# Patient Record
Sex: MALE | Race: WHITE
[De-identification: names, ages, dates, MRNs, and addresses within clinical notes are randomized per-mention and may not be internally consistent; named-entity substitution may affect disease eponyms.]

---

## 2017-11-29 ENCOUNTER — HOSPITAL ENCOUNTER (OUTPATIENT)
Dept: HOSPITAL 47 - LABWHC1 | Age: 27
Discharge: HOME | End: 2017-11-29
Payer: COMMERCIAL

## 2017-11-29 DIAGNOSIS — J18.9: Primary | ICD-10-CM

## 2017-11-29 LAB
ANION GAP SERPL CALC-SCNC: 8 MMOL/L
BASOPHILS # BLD AUTO: 0 K/UL (ref 0–0.2)
BASOPHILS NFR BLD AUTO: 0 %
BUN SERPL-SCNC: 11 MG/DL (ref 9–20)
CALCIUM SPEC-MCNC: 9.6 MG/DL (ref 8.4–10.2)
CH: 29.1
CHCM: 33.4
CHLORIDE SERPL-SCNC: 98 MMOL/L (ref 98–107)
CO2 SERPL-SCNC: 30 MMOL/L (ref 22–30)
EOSINOPHIL # BLD AUTO: 0.2 K/UL (ref 0–0.7)
EOSINOPHIL NFR BLD AUTO: 1 %
ERYTHROCYTE [DISTWIDTH] IN BLOOD BY AUTOMATED COUNT: 5.37 M/UL (ref 4.3–5.9)
ERYTHROCYTE [DISTWIDTH] IN BLOOD: 12.5 % (ref 11.5–15.5)
GLUCOSE SERPL-MCNC: 92 MG/DL (ref 74–99)
HCT VFR BLD AUTO: 46.9 % (ref 39–53)
HDW: 2.74
HGB BLD-MCNC: 15.4 GM/DL (ref 13–17.5)
LUC NFR BLD AUTO: 2 %
LYMPHOCYTES # SPEC AUTO: 1.4 K/UL (ref 1–4.8)
LYMPHOCYTES NFR SPEC AUTO: 12 %
MCH RBC QN AUTO: 28.6 PG (ref 25–35)
MCHC RBC AUTO-ENTMCNC: 32.8 G/DL (ref 31–37)
MCV RBC AUTO: 87.3 FL (ref 80–100)
MONOCYTES # BLD AUTO: 0.7 K/UL (ref 0–1)
MONOCYTES NFR BLD AUTO: 6 %
NEUTROPHILS # BLD AUTO: 9.2 K/UL (ref 1.3–7.7)
NEUTROPHILS NFR BLD AUTO: 79 %
NON-AFRICAN AMERICAN GFR(MDRD): >60
POTASSIUM SERPL-SCNC: 4.5 MMOL/L (ref 3.5–5.1)
SODIUM SERPL-SCNC: 136 MMOL/L (ref 137–145)
WBC # BLD AUTO: 0.18 10*3/UL
WBC # BLD AUTO: 11.6 K/UL (ref 3.8–10.6)
WBC (PEROX): 11.37

## 2017-11-29 PROCEDURE — 36415 COLL VENOUS BLD VENIPUNCTURE: CPT

## 2017-11-29 PROCEDURE — 80048 BASIC METABOLIC PNL TOTAL CA: CPT

## 2017-11-29 PROCEDURE — 85025 COMPLETE CBC W/AUTO DIFF WBC: CPT

## 2020-11-11 ENCOUNTER — HOSPITAL ENCOUNTER (OUTPATIENT)
Dept: HOSPITAL 47 - LABWHC1 | Age: 30
Discharge: HOME | End: 2020-11-11
Attending: FAMILY MEDICINE
Payer: COMMERCIAL

## 2020-11-11 DIAGNOSIS — Z20.828: Primary | ICD-10-CM

## 2020-11-11 PROCEDURE — 87502 INFLUENZA DNA AMP PROBE: CPT

## 2021-06-21 ENCOUNTER — HOSPITAL ENCOUNTER (OUTPATIENT)
Dept: HOSPITAL 47 - EC | Age: 31
Setting detail: OBSERVATION
LOS: 2 days | Discharge: HOME | End: 2021-06-23
Attending: HOSPITALIST | Admitting: HOSPITALIST
Payer: COMMERCIAL

## 2021-06-21 DIAGNOSIS — Z87.891: ICD-10-CM

## 2021-06-21 DIAGNOSIS — R94.31: ICD-10-CM

## 2021-06-21 DIAGNOSIS — R47.01: Primary | ICD-10-CM

## 2021-06-21 DIAGNOSIS — Z20.822: ICD-10-CM

## 2021-06-21 DIAGNOSIS — Z79.899: ICD-10-CM

## 2021-06-21 DIAGNOSIS — F41.1: ICD-10-CM

## 2021-06-21 DIAGNOSIS — E66.9: ICD-10-CM

## 2021-06-21 DIAGNOSIS — Z82.3: ICD-10-CM

## 2021-06-21 DIAGNOSIS — R47.81: ICD-10-CM

## 2021-06-21 DIAGNOSIS — Z82.49: ICD-10-CM

## 2021-06-21 LAB
ALBUMIN SERPL-MCNC: 4.7 G/DL (ref 3.5–5)
ALP SERPL-CCNC: 53 U/L (ref 38–126)
ALT SERPL-CCNC: 30 U/L (ref 4–49)
ANION GAP SERPL CALC-SCNC: 10 MMOL/L
APTT BLD: 26.9 SEC (ref 22–30)
AST SERPL-CCNC: 32 U/L (ref 17–59)
BASOPHILS # BLD AUTO: 0 K/UL (ref 0–0.2)
BASOPHILS NFR BLD AUTO: 0 %
BUN SERPL-SCNC: 18 MG/DL (ref 9–20)
CALCIUM SPEC-MCNC: 9 MG/DL (ref 8.4–10.2)
CHLORIDE SERPL-SCNC: 102 MMOL/L (ref 98–107)
CO2 SERPL-SCNC: 27 MMOL/L (ref 22–30)
EOSINOPHIL # BLD AUTO: 0.1 K/UL (ref 0–0.7)
EOSINOPHIL NFR BLD AUTO: 1 %
ERYTHROCYTE [DISTWIDTH] IN BLOOD BY AUTOMATED COUNT: 5.26 M/UL (ref 4.3–5.9)
ERYTHROCYTE [DISTWIDTH] IN BLOOD: 12.5 % (ref 11.5–15.5)
GLUCOSE BLD-MCNC: 124 MG/DL (ref 75–99)
GLUCOSE SERPL-MCNC: 108 MG/DL (ref 74–99)
HCT VFR BLD AUTO: 44.2 % (ref 39–53)
HGB BLD-MCNC: 15.4 GM/DL (ref 13–17.5)
INR PPP: 0.9 (ref ?–1.2)
LYMPHOCYTES # SPEC AUTO: 1.8 K/UL (ref 1–4.8)
LYMPHOCYTES NFR SPEC AUTO: 23 %
MCH RBC QN AUTO: 29.3 PG (ref 25–35)
MCHC RBC AUTO-ENTMCNC: 34.9 G/DL (ref 31–37)
MCV RBC AUTO: 84 FL (ref 80–100)
MONOCYTES # BLD AUTO: 0.4 K/UL (ref 0–1)
MONOCYTES NFR BLD AUTO: 6 %
NEUTROPHILS # BLD AUTO: 5.1 K/UL (ref 1.3–7.7)
NEUTROPHILS NFR BLD AUTO: 68 %
PH UR: 5.5 [PH] (ref 5–8)
PLATELET # BLD AUTO: 204 K/UL (ref 150–450)
POTASSIUM SERPL-SCNC: 3.7 MMOL/L (ref 3.5–5.1)
PROT SERPL-MCNC: 7.3 G/DL (ref 6.3–8.2)
PT BLD: 10.2 SEC (ref 9–12)
SODIUM SERPL-SCNC: 139 MMOL/L (ref 137–145)
SP GR UR: 1.01 (ref 1–1.03)
UROBILINOGEN UR QL STRIP: <2 MG/DL (ref ?–2)
WBC # BLD AUTO: 7.6 K/UL (ref 3.8–10.6)

## 2021-06-21 PROCEDURE — 70498 CT ANGIOGRAPHY NECK: CPT

## 2021-06-21 PROCEDURE — 84443 ASSAY THYROID STIM HORMONE: CPT

## 2021-06-21 PROCEDURE — 36415 COLL VENOUS BLD VENIPUNCTURE: CPT

## 2021-06-21 PROCEDURE — 83036 HEMOGLOBIN GLYCOSYLATED A1C: CPT

## 2021-06-21 PROCEDURE — 96361 HYDRATE IV INFUSION ADD-ON: CPT

## 2021-06-21 PROCEDURE — 92523 SPEECH SOUND LANG COMPREHEN: CPT

## 2021-06-21 PROCEDURE — 93005 ELECTROCARDIOGRAM TRACING: CPT

## 2021-06-21 PROCEDURE — 81003 URINALYSIS AUTO W/O SCOPE: CPT

## 2021-06-21 PROCEDURE — 85025 COMPLETE CBC W/AUTO DIFF WBC: CPT

## 2021-06-21 PROCEDURE — 87635 SARS-COV-2 COVID-19 AMP PRB: CPT

## 2021-06-21 PROCEDURE — 80306 DRUG TEST PRSMV INSTRMNT: CPT

## 2021-06-21 PROCEDURE — 96360 HYDRATION IV INFUSION INIT: CPT

## 2021-06-21 PROCEDURE — 85610 PROTHROMBIN TIME: CPT

## 2021-06-21 PROCEDURE — 99285 EMERGENCY DEPT VISIT HI MDM: CPT

## 2021-06-21 PROCEDURE — 85730 THROMBOPLASTIN TIME PARTIAL: CPT

## 2021-06-21 PROCEDURE — 93306 TTE W/DOPPLER COMPLETE: CPT

## 2021-06-21 PROCEDURE — 70496 CT ANGIOGRAPHY HEAD: CPT

## 2021-06-21 PROCEDURE — 70450 CT HEAD/BRAIN W/O DYE: CPT

## 2021-06-21 PROCEDURE — 70553 MRI BRAIN STEM W/O & W/DYE: CPT

## 2021-06-21 PROCEDURE — 80053 COMPREHEN METABOLIC PANEL: CPT

## 2021-06-21 NOTE — CT
EXAMINATION TYPE: CT brain wo con

 

DATE OF EXAM: 6/21/2021

 

COMPARISON: None

 

HISTORY: Expressive aphasia. Pt denies any previous occurance

 

CT DLP: 1145.4 mGycm

Automated exposure control for dose reduction was used.

 

Ventricles have normal size. There is no mass effect nor midline shift. There is no sign of intracran
ial hemorrhage. Calvarium is intact. Skull base is intact. There is normal aeration of the mastoid si
nuses.

 

IMPRESSION:

Negative unenhanced head CT scan.

## 2021-06-21 NOTE — ED
Neuro HPI





- General


Chief Complaint: Neuro Symptoms/Deficit


Stated Complaint: trouble speaking


Time Seen by Provider: 06/21/21 19:32


Source: patient


Mode of arrival: ambulatory


Limitations: no limitations





- History of Present Illness


Is the patient presenting with stroke symptoms?: No


Initial Comments: 





31-year-old male with no significant past medical history presents emergency 

Department with chief complaint of difficulty speaking.  He reports symptoms 

began around 1700 today while he was at work on his desk.  He states he began 

having difficulty expressing his words and stuttering.  Does not have a history 

of anything like this before.  States now he is more anxious regarding her 

situation.  He reports about 2 weeks he was started on Lexapro 10 mg but 

otherwise no new medicines have been taken.  He denies any one-sided weakness 

paresthesias visual changes or gait instability.





- Related Data


Home Medications: 


                                Home Medications











 Medication  Instructions  Recorded  Confirmed


 


Albuterol Sulfate [Proair 2 puff INHALATION RT-Q4H PRN 11/20/17 12/07/17





Respiclick]   


 


Beclomethasone Dipropionate [Qvar 1 puff INHALATION DAILY 12/05/17 12/07/17





80 mcg]   


 


Levofloxacin [Levaquin] 500 mg PO DAILY 12/05/17 12/07/17


 


Loratadine-Pseudoeph 5-120 mg 1 each PO Q12HR PRN 12/05/17 12/07/17





[Claritin-D 12 Hour]   


 


Promethaz-Cod 6.25-10 mg/5 ml 5 ml PO Q6HR PRN 12/05/17 12/07/17





[Phenergan with Codeine]   











Allergies/Adverse Reactions: 


                                    Allergies











Allergy/AdvReac Type Severity Reaction Status Date / Time


 


No Known Allergies Allergy   Verified 06/21/21 19:07














Review of Systems


ROS Statement: 


Those systems with pertinent positive or pertinent negative responses have been 

documented in the HPI.





ROS Other: All systems not noted in ROS Statement are negative.





General Exam


Limitations: no limitations


General appearance: alert, in no apparent distress


Head exam: Present: atraumatic, normocephalic, normal inspection


Eye exam: Present: normal appearance, PERRL, EOMI


Pupils: Present: normal accommodation


ENT exam: Present: normal exam, normal oropharynx, mucous membranes moist


Neck exam: Present: normal inspection, full ROM.  Absent: tenderness


Respiratory exam: Present: normal lung sounds bilaterally.  Absent: respiratory 

distress


Cardiovascular Exam: Present: regular rate, normal rhythm, normal heart sounds. 

Absent: systolic murmur


Extremities exam: Present: normal inspection, full ROM, normal capillary refill.

 Absent: tenderness, pedal edema, joint swelling


Back exam: Present: normal inspection, full ROM.  Absent: tenderness, CVA 

tenderness (R), CVA tenderness (L)


Neurological exam: Present: alert, oriented X3, CN II-XII intact, normal gait


  ** Expanded


Neurological exam: Present: expressive aphasia


Patient oriented to: Present: person, place, time


Speech: Present: expressive aphasia


Cranial nerves: EOM's Intact: Normal, Gag Reflex: Normal, Tongue Deviation: 

Normal, Nystagmus: Normal, Facial Sensation: Normal


Cerebellar function: Finger to Nose: Normal


Upper motor neuron: Pronator Drift: Normal


Sensory exam: Upper Extremity Light Touch: Normal, Upper Extremity Pin Prick: 

Normal, Lower Extremity Light Touch: Normal, Lower Extremity Pin Prick: Normal


Motor strength exam: RUE: 5, LUE: 5, RLE: 5, LLE: 5


Psychiatric exam: Present: normal affect, normal mood


Skin exam: Present: warm, dry, intact, normal color





Stroke MDM





- Lab Data


Result diagrams: 


                                 06/21/21 19:51





                                 06/21/21 19:51


                                   Lab Results











  06/21/21 06/21/21 06/21/21 Range/Units





  19:17 19:51 19:51 


 


WBC   7.6   (3.8-10.6)  k/uL


 


RBC   5.26   (4.30-5.90)  m/uL


 


Hgb   15.4   (13.0-17.5)  gm/dL


 


Hct   44.2   (39.0-53.0)  %


 


MCV   84.0   (80.0-100.0)  fL


 


MCH   29.3   (25.0-35.0)  pg


 


MCHC   34.9   (31.0-37.0)  g/dL


 


RDW   12.5   (11.5-15.5)  %


 


Plt Count   204   (150-450)  k/uL


 


MPV   7.8   


 


Neutrophils %   68   %


 


Lymphocytes %   23   %


 


Monocytes %   6   %


 


Eosinophils %   1   %


 


Basophils %   0   %


 


Neutrophils #   5.1   (1.3-7.7)  k/uL


 


Lymphocytes #   1.8   (1.0-4.8)  k/uL


 


Monocytes #   0.4   (0-1.0)  k/uL


 


Eosinophils #   0.1   (0-0.7)  k/uL


 


Basophils #   0.0   (0-0.2)  k/uL


 


PT     (9.0-12.0)  sec


 


INR     (<1.2)  


 


APTT     (22.0-30.0)  sec


 


Sodium    139  (137-145)  mmol/L


 


Potassium    3.7  (3.5-5.1)  mmol/L


 


Chloride    102  ()  mmol/L


 


Carbon Dioxide    27  (22-30)  mmol/L


 


Anion Gap    10  mmol/L


 


BUN    18  (9-20)  mg/dL


 


Creatinine    1.07  (0.66-1.25)  mg/dL


 


Est GFR (CKD-EPI)AfAm    >90  (>60 ml/min/1.73 sqM)  


 


Est GFR (CKD-EPI)NonAf    >90  (>60 ml/min/1.73 sqM)  


 


Glucose    108 H  (74-99)  mg/dL


 


POC Glucose (mg/dL)  124 H    (75-99)  mg/dL


 


POC Glu Operater [facility]    


 


Calcium    9.0  (8.4-10.2)  mg/dL


 


Total Bilirubin    0.3  (0.2-1.3)  mg/dL


 


AST    32  (17-59)  U/L


 


ALT    30  (4-49)  U/L


 


Alkaline Phosphatase    53  ()  U/L


 


Total Protein    7.3  (6.3-8.2)  g/dL


 


Albumin    4.7  (3.5-5.0)  g/dL


 


Urine Color     


 


Urine Appearance     (Clear)  


 


Urine pH     (5.0-8.0)  


 


Ur Specific Gravity     (1.001-1.035)  


 


Urine Protein     (Negative)  


 


Urine Glucose (UA)     (Negative)  


 


Urine Ketones     (Negative)  


 


Urine Blood     (Negative)  


 


Urine Nitrite     (Negative)  


 


Urine Bilirubin     (Negative)  


 


Urine Urobilinogen     (<2.0)  mg/dL


 


Ur Leukocyte Esterase     (Negative)  














  06/21/21 06/21/21 Range/Units





  19:51 19:51 


 


WBC    (3.8-10.6)  k/uL


 


RBC    (4.30-5.90)  m/uL


 


Hgb    (13.0-17.5)  gm/dL


 


Hct    (39.0-53.0)  %


 


MCV    (80.0-100.0)  fL


 


MCH    (25.0-35.0)  pg


 


MCHC    (31.0-37.0)  g/dL


 


RDW    (11.5-15.5)  %


 


Plt Count    (150-450)  k/uL


 


MPV    


 


Neutrophils %    %


 


Lymphocytes %    %


 


Monocytes %    %


 


Eosinophils %    %


 


Basophils %    %


 


Neutrophils #    (1.3-7.7)  k/uL


 


Lymphocytes #    (1.0-4.8)  k/uL


 


Monocytes #    (0-1.0)  k/uL


 


Eosinophils #    (0-0.7)  k/uL


 


Basophils #    (0-0.2)  k/uL


 


PT  10.2   (9.0-12.0)  sec


 


INR  0.9   (<1.2)  


 


APTT  26.9   (22.0-30.0)  sec


 


Sodium    (137-145)  mmol/L


 


Potassium    (3.5-5.1)  mmol/L


 


Chloride    ()  mmol/L


 


Carbon Dioxide    (22-30)  mmol/L


 


Anion Gap    mmol/L


 


BUN    (9-20)  mg/dL


 


Creatinine    (0.66-1.25)  mg/dL


 


Est GFR (CKD-EPI)AfAm    (>60 ml/min/1.73 sqM)  


 


Est GFR (CKD-EPI)NonAf    (>60 ml/min/1.73 sqM)  


 


Glucose    (74-99)  mg/dL


 


POC Glucose (mg/dL)    (75-99)  mg/dL


 


POC Glu Operater ID    


 


Calcium    (8.4-10.2)  mg/dL


 


Total Bilirubin    (0.2-1.3)  mg/dL


 


AST    (17-59)  U/L


 


ALT    (4-49)  U/L


 


Alkaline Phosphatase    ()  U/L


 


Total Protein    (6.3-8.2)  g/dL


 


Albumin    (3.5-5.0)  g/dL


 


Urine Color   Light Yellow  


 


Urine Appearance   Clear  (Clear)  


 


Urine pH   5.5  (5.0-8.0)  


 


Ur Specific Gravity   1.007  (1.001-1.035)  


 


Urine Protein   Negative  (Negative)  


 


Urine Glucose (UA)   Negative  (Negative)  


 


Urine Ketones   Negative  (Negative)  


 


Urine Blood   Negative  (Negative)  


 


Urine Nitrite   Negative  (Negative)  


 


Urine Bilirubin   Negative  (Negative)  


 


Urine Urobilinogen   <2.0  (<2.0)  mg/dL


 


Ur Leukocyte Esterase   Negative  (Negative)  














- Medical Decision Making


31-year-old male with no significant past medical history presents emergency 

Department with chief complaint of difficulty speaking.  On physical 

examination, patient is experiencing difficulties with speech.  He states it 

feels more of difficulty verbalizing towards even though he can think of them.  

States it does not feel like stuttering.  I suspect expressive aphasia.  No 

other focal deficits on exam.  CT of the brain is unremarkable.  CT angiogram 

head and neck is also unremarkable.  Laboratory work shows no acute findings.  I

spoke with Dr. Munson who recommended he is on consult .  He also recommended 

psychiatry consult due to patient being started on Lexapro for generalized 

anxiety.  He also recommended aspirin and Lipitor.  Patient will be admitted for

further medical management.  Case discussed with Dr. Marte.


Admitting physician is 


Neurology consult





- EKG Data


-: EKG Interpreted by Me


EKG shows normal: sinus rhythm


Rate: normal





Past Medical History


Past Medical History: No Reported History


History of Any Multi-Drug Resistant Organisms: None Reported


Past Surgical History: Ear Surgery


Additional Past Surgical History / Comment(s): ear tubes, plastic sx on nose as 

a child


Past Anesthesia/Blood Transfusion Reactions: No Reported Reaction


Past Psychological History: No Psychological Hx Reported


Smoking Status: Former smoker


Past Alcohol Use History: Occasional


Past Drug Use History: None Reported





- Past Family History


  ** Mother


Family Medical History: No Reported History





  ** Father


Family Medical History: Hyperlipidemia, Hypertension


Additional Family Medical History / Comment(s): back problems





Course


                                   Vital Signs











  06/21/21 06/21/21 06/21/21





  19:03 21:37 23:00


 


Temperature 98.9 F  


 


Pulse Rate 85 63 59 L


 


Respiratory 18 16 16





Rate   


 


Blood Pressure 149/96 138/85 146/96


 


O2 Sat by Pulse 99 98 97





Oximetry   














Disposition


Clinical Impression: 


 Difficulty with speech





Disposition: ADMITTED AS IP TO THIS [facility]


Condition: Stable


Is patient prescribed a controlled substance at d/c from ED?: No


Referrals: 


ZONIA Benites III, MD [Primary Care Provider] - 1-2 days


Time of Disposition: 23:07

## 2021-06-21 NOTE — CT
EXAMINATION TYPE: CT angio head neck

 

DATE OF EXAM: 6/21/2021

 

COMPARISON: None

 

HISTORY: aphaisa, nuero deficit

 

CT DLP: 980 mGycm

Automated exposure control for dose reduction was used.

 

CONTRAST: 

Performed with IV Contrast, patient injected with 100 mL of Isovue 370.

 

 

Images obtained from the aortic arch to the vertex of the brain with IV contrast. There are 3-D post 
processed images.

 

There is normal branching pattern of the great vessels on the aortic arch. There is bilateral arteria
l flow in the subclavian arteries. There is arterial flow in the common internal and external carotid
 arteries. There is wide patency of the carotid artery bifurcations. There is arterial flow in both v
ertebral arteries.

 

There is arterial flow in the vertebrobasilar artery system.

 

There is no evidence of carotid or vertebral artery aneurysm or dissection.

 

There is arterial flow in the anterior middle and posterior cerebral arteries. I see no evidence of i
ntracranial aneurysm or neovascularity. There is normal enhancement of the venous sinuses. I see no e
vidence of intracranial arterial stenosis. There is no mass effect.

 

IMPRESSION:

Negative CT angiogram of the neck. New graft negative CT angiogram of the brain.

## 2021-06-22 LAB — HBA1C MFR BLD: 4.9 % (ref 4–6)

## 2021-06-22 RX ADMIN — CLOPIDOGREL BISULFATE SCH MG: 75 TABLET ORAL at 20:16

## 2021-06-22 RX ADMIN — ASPIRIN 81 MG CHEWABLE TABLET SCH MG: 81 TABLET CHEWABLE at 09:47

## 2021-06-22 RX ADMIN — CEFAZOLIN SCH MLS/HR: 330 INJECTION, POWDER, FOR SOLUTION INTRAMUSCULAR; INTRAVENOUS at 00:04

## 2021-06-22 RX ADMIN — ESCITALOPRAM OXALATE SCH MG: 10 TABLET, FILM COATED ORAL at 14:12

## 2021-06-22 RX ADMIN — CEFAZOLIN SCH MLS/HR: 330 INJECTION, POWDER, FOR SOLUTION INTRAMUSCULAR; INTRAVENOUS at 13:50

## 2021-06-22 NOTE — MR
EXAMINATION TYPE: MR brain wo/w con

 

DATE OF EXAM: 6/22/2021

 

COMPARISON: CT brain 6/21/2021

 

HISTORY: Slurred speech, stroke.  Transient difficulty getting words out

 

 

TECHNIQUE: 

Multiplanar, multisequence images of the brain and brainstem is performed without and with IV contras
t, utilizing 12 mL intravenous Gadavist .

 

FINDINGS: Diffusion weighted images demonstrate no evidence of a recent infarct or other diffusion ab
normality.  There is no extra-axial fluid collection or significant white matter signal abnormality. 
 The ventricular system and cisternal spaces are normal in size and appearance.  The brain volume is 
age appropriate.

 

Midline structures demonstrate normal morphology, there is a partially empty sella.  The craniocervic
al junction appears within normal limits.  Post contrast images demonstrate no abnormal enhancement. 
The dural venous sinuses appear patent. The visualized sinuses are remarkable for mild inflammatory c
hange in ethmoid air cells and the globes are intact.

 

IMPRESSION: Normal brain MRI with and without contrast

## 2021-06-22 NOTE — P.CN
Psychiatric Consult





- .


Consult date: 06/22/21


Consult:: 





06/22/21 12:48


IDENTIFYING DATA: This patient is a 31-year-old  male who currently 

lives with his fiance in a house has no kids and currently works as a sales 

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





REASON FOR REFERRAL: Psychiatry was consulted for "generalized anxiety, placed 

on new medication"





HISTORY OF PRESENT ILLNESS: The patient presented to the hospital yesterday and 

was complaining of speech difficulties which had a sudden onset.  Apparently 

patient had these symptoms, approximately 5 PM yesterday while he was at work 

and was reported to be stuttering and "couldn't express his words" according to 

ER report.  Patient was also endorsing anxiety and having been started on 

Lexapro recently.  Patient's UA was negative computed tomography scan of his 

brain was negative.  Neurology was consulted and following patient for possible 

stroke.  Patient was seen today at the bedside and agreeable to speak to writer.

 He claims that he was in his office and an employee asked him a question 

however he states that "I was unable to respond" and claims that she has 

difficulty initiating a response.  He states that the symptoms lasted 

approximately 5-6 hours and subsided afterwards and states that he is not having

any symptoms at this time.  He claims that he has never had an incident like 

this in the past and did not mention any other lateralizing signs or denies any 

visual changes it are paresthesias or weakness.  He claims that his mood is 

"fine" and was fairly future oriented.  He states that he has had anxiety mainly

related to his work and the stressors involved with it.  He claims that it is 

present mainly at night time where he has racing thoughts and difficulty 

initiating sleep.  He claims that on a good night sleep approximately 6 hours a 

night.  He states that he has been having extra stress at work lately.  He 

claims that his appetite is fair. At this time patient denies any suicidal or 

homical ideations, intent or plan. Patient denies any auditory, visual 

hallucinations and denies any paranoia or delusions. Patients admits to using 

cigarettes in the past however has not quit.  He denies any other recreational 

drug use.





PAST PSYCHIATRIC HISTORY: Patient has a a history of anxiety.  Patient is curr

ently on Lexapro 10 mg daily prescribed by his PCP.  Patient denies any previous

psychiatric hospitalizations. Patient denies any psychiatric outpatient follow-

up. Patient denies any history of suicide attempts in the past.





PAST MEDICAL HISTORY: denies. 





ALLERGIES: as per EMR. 





CHEMICAL DEPENDENCY HISTORY: as per HPI. 





FAMILY PSYCHIATRIC/SUBSTANCE USE HISTORY: denies





SOCIAL HISTORY: Patient was born and raised in [facility].  He states 

that he has some college.  He claims that he works as a  at a car 

car dealership.  He claims that he currently lives with his fiance in a house. 

They have no kids together.





MENTAL STATUS EXAM: 


General Appearance: Patient appears to be well built, stated age is alert, 

pleasant, and cooperative. Patient appears to have fair hygiene and grooming 

wearing hospital gown with fair eye contact.


Behavior: Patient is calmly lying in bed without any agitated behavior.


Speech: Patient's speech is fluent and nonpressured. 


Mood/Affect: Patient reports their mood is "anxious at times", affect is 

congruent


Suicidality/Homicidality:  Patient denies having any suicidal or homicidal 

ideation intent or plan.  


Perceptions: Patient denies any visual hallucinations and denies any auditory 

hallucinations  


Though content/process: There is no evidence of any delusional thought content 

and thought process is linear and goal-directed.  Concerned of his symptoms.


Memory and concentration: AOX3, grossly intact for the purposes of this session.

Can spell "WORLD" backwards


Judgment and insight: fair





IMPRESSIONS: 


Anxiety disorder unspecified r/o generalized anxiety disorder vs adjustment 

disorder with anxiety





PLAN: 


-At this time patient DOES NOT meet criteria for inpatient psychiatric 

admission.


-Would recommend the following medication changes/additions: Continue with 

Lexapro 10 mg daily for mood/anxiety.  Lexapro is most likely not the cause of 

patient's acute neurological symptoms as it is not a common adverse reaction.  

Patient is agreeable to start trazodone 25 mg daily at bedtime for 

insomnia/mood.  Spoke with patient about the various side effects of the 

medications including trazodone and to risk of priapism as an adverse reaction 

and the need to seek urgent medical attention if this does occur.


- to provide patient with outpatient mental health/psychiatry 

resources for appropriate follow up upon discharge


-discussed stress reducing techniques


-Communicated plan to patient's nurse


-Psychiatry will sign off at this time


-Please contact with any questions.

## 2021-06-22 NOTE — ECHOF
Referral Reason:stroke. Bubble study



MEASUREMENTS

--------

HEIGHT: 175.3 cm

WEIGHT: 115.7 kg

BP: 145/88

RVIDd:   2.8 cm     (< 3.3)

IVSd:   1.4 cm     (0.6 - 1.1)

LVIDd:   4.5 cm     (3.9 - 5.3)

LVPWd:   1.2 cm     (0.6 - 1.1)

IVSs:   1.7 cm

LVIDs:   3.4 cm

LVPWs:   1.9 cm

LA Diam:   3.5 cm     (2.7 - 3.8)

LAESV Index (A-L):   23.68 ml/m

Ao Diam:   3.3 cm     (2.0 - 3.7)

AV Cusp:   2.3 cm     (1.5 - 2.6)

MV EXCURSION:   18.547 mm     (> 18.000)

MV EF SLOPE:   125 mm/s     (70 - 150)

EPSS:   1.1 cm

MV E Jimmie:   0.87 m/s

MV DecT:   220 ms

MV A Jimmie:   0.61 m/s

MV E/A Ratio:   1.43 

RAP:   5.00 mmHg

RVSP:   19.65 mmHg







FINDINGS

--------

Resting bradycardia (HR<60bpm).

This was a technically adequate study.

The left ventricular size is normal.   There is moderate concentric left ventricular hypertrophy.   O
verall left ventricular systolic function is normal with, an EF between 60 - 65 %.

The right ventricle is normal in size.

Normal LA  size by volume 22+/-6 ml/m2.

The right atrium is normal in size.

Contrast study was performed with 2 iv injections of 8 ccs of agitated normal saline, at rest, and wi
th cough.

Negative saline bubble study. No PFO noted

Interatrial and interventricular septum intact.

The aortic valve is trileaflet, and appears structurally normal. No aortic stenosis or regurgitation.


The mitral valve is normal.

Mild tricuspid regurgitation present.   Right ventricular systolic pressure is normal at < 35 mmHg.

Trace/mild (physiologic)  pulmonic regurgitation.

The aortic root size is normal.

Normal inferior vena cava with normal inspiratory collapse consistent with estimated right atrial pre
ssure of  5 mmHg.

There is no pericardial effusion.



CONCLUSIONS

--------

1. The left ventricular size is normal.

2. There is moderate concentric left ventricular hypertrophy.

3. Overall left ventricular systolic function is normal with, an EF between 60 - 65 %.

4. Contrast study was performed with 2 iv injections of 8 ccs of agitated normal saline, at rest, and
 with cough.

5. Negative saline bubble study. No PFO noted

6. Mild tricuspid regurgitation present.

7. Trace/mild (physiologic)  pulmonic regurgitation.

8. There is no pericardial effusion.





SONOGRAPHER: Sunitha Hernandez RDCS

## 2021-06-22 NOTE — P.CNNES
History of Present Illness


Consult date: 06/22/21


Requesting physician: Terence Huerta


Reason for Consult: speech difficulties


History of Present Illness: 


This is a 31-year-old gentleman with history of anxiety who presented emergency 

department on 06/21/2021 with a complaint of difficulty speaking.  He stated 

that yesterday while he was at work around 5pm he noticed that he knew what he 

wanted to say but he was having difficulty getting his words out and felt he has

some mild slurring of the speech.  He also noticed that he was having 

stuttering.  He denies any other neurological complaints associate with this.  

Denies of any focal weakness, numbness, difficulty swallowing, any visual 

disturbance.  He denies any headache prior to the episode.  He felt like he had 

the mild headache when he was in the ED.  He felt his his speech difficulty 

lasted a few hours and resolved by nighttime.  He denies any history of stroke 

or TIA in the past.   He stated that in the last 2 to 3 weeks started on a new 

medication Lexapro 10 mg otherwise he has not been on any other medication.  He 

denies feeling more anxious than normal yesterday.  There is a family history of

stroke but he cannot tell me at what age and who had the stroke.  He is to have 

a history of smoking and has stopped about 2 years ago (smoked for 10-12 years 

about 1PPD).  She socially drinks alcohol.  He denies any illicit drug use.


The patient home medications includes of Lexapro 10 mg daily, loratadine, 

vitamin D3.





Some of the workup in the hospital consisted of:


Initial vital signs: Blood pressure of 149/96, heart rate of 85, respiratory of 

18, temperature of 98.9 Fahrenheit oral and pulse ox of 99% room air.


CT of the head is reported as negative unenhanced head CT scan.


CT angiography of the head and neck is reported as negative.


EKG is reported as poor data quality, interpretation may be adversely affected. 

Normal sinus rhythm.  Possible lateral infarct, age undetermined.  Abnormal EKG.


CBC with differential is within normal limits.


The chemistry panel seems unremarkable.  Initial POC glucose is 124.








Review of Systems


Review of system:  The 12 point system was reviewed and apparent positive and 

negative per HPI.








Past Medical History


Past Medical History: No Reported History


History of Any Multi-Drug Resistant Organisms: None Reported


Past Surgical History: Ear Surgery


Additional Past Surgical History / Comment(s): ear tubes, plastic sx on nose as 

a child


Past Anesthesia/Blood Transfusion Reactions: No Reported Reaction


Past Psychological History: No Psychological Hx Reported


Additional Psychological History / Comment(s): pt is independant. lives alone in

a loft. no pets. no  service, no medical euqipment., works in sales


Smoking Status: Former smoker


Past Alcohol Use History: Occasional


Additional Past Alcohol Use History / Comment(s): started smoking at age 17 at 

age 25. smoked 1 ppd


Past Drug Use History: None Reported


Additional Drug Use History / Comment(s): quit smoking marijuana in college





- Past Family History


  ** Mother


Family Medical History: No Reported History





  ** Father


Family Medical History: Hyperlipidemia, Hypertension


Additional Family Medical History / Comment(s): back problems





Medications and Allergies


                                Home Medications











 Medication  Instructions  Recorded  Confirmed  Type


 


Cholecalciferol (Vitamin D3) 125 mcg PO DAILY 06/21/21 06/21/21 History





[Vitamin D3 (5000 Iu)]    


 


Escitalopram [Lexapro] 10 mg PO DAILY 06/21/21 06/21/21 History


 


Fluticasone Nasal Spray [Flonase 2 spr EA NOSTRIL DAILY PRN 06/21/21 06/21/21 

History





Nasal Spray]    


 


Loratadine [Claritin] 10 mg PO DAILY 06/21/21 06/21/21 History








                                    Allergies











Allergy/AdvReac Type Severity Reaction Status Date / Time


 


No Known Allergies Allergy   Verified 06/21/21 23:25














Physical Examination





- Vital Signs


Vital Signs: 


                                   Vital Signs











  Temp Pulse Pulse Resp BP BP Pulse Ox


 


 06/22/21 07:00  97.8 F   65  17   145/88  98


 


 06/22/21 00:55  97.6 F   58 L  16   151/95  98


 


 06/21/21 23:00   59 L   16  146/96   97


 


 06/21/21 21:37   63   16  138/85   98


 


 06/21/21 19:03  98.9 F  85   18  149/96   99








                                Intake and Output











 06/21/21 06/22/21 06/22/21





 22:59 06:59 14:59


 


Intake Total   240


 


Balance   240


 


Intake:   


 


  Oral   240


 


Other:   


 


  Voiding Method  Toilet Toilet


 


  # Voids  1 


 


  Weight 115.666 kg 115.666 kg 











GENERAL: The patient is lying in bed and is not in acute distress.


CHEST: The heart rate is regular rate rhythm.   No murmurs to auscultation.  No 

carotid bruit bilaterally.


LUNG: Clear to auscultation bilaterally no wheezing noted throughout.  Not 

labored breathing.


ABDOMEN/GI: Bowel sounds present in all 4 quadrants. No tenderness to palpation 

throughout.





NEUROLOGICAL:


Higher mental function: The patient is awake, alert, oriented to self, place and

 time.  Patient is following commands.  No aphasia and no neglect.


Cranial nerves: The pupils are round, equal and reactive to light and 

accommodation.  Visual fields are full to confrontation throughout.  Extraocular

 movement is intact no nystagmus is noted.  Facial sensation is normal to touch 

throughout.  The facial strength is normal throughout.  Hearing is normal 

bilaterally to hand rub.  Tongue is midline and moved side-to-side without any 

difficulty.  No dysarthria is noted.  Shoulder shrug is normal bilaterally.


Motor: Gait is normal with normal arm swings.  The strength is 5 over 5 

throughout.  Normal tone and bulk.  


Cerebellum: Normal finger to nose heel to shin bilaterally.


Sensation: Sensation is normal to touch throughout.


Reflexes (right/left): 3+ over bilateral patellar.  Otherwise 2+ throughout.


Plantars are downgoing bilaterally. 








Results


Urinalysis is negative for urinary tract infection


Corona virus patient was not detected.








- Laboratory Findings


CBC and BMP: 


                                 06/21/21 19:51





                                 06/21/21 19:51


Abnormal Lab Findings: 


                                  Abnormal Labs











  06/21/21 06/21/21





  19:17 19:51


 


Glucose   108 H


 


POC Glucose (mg/dL)  124 H 














Assessment and Plan


Assessment: 





Transient episode of expressive aphasia with stuttering.  Likely Transient Ische

juan alberto attack.  I hightly doubt medication effect (Lexapro especially since low 

dose and unlikely adverse effect).  


Anxiety disorder





Plan: 





* CT of the head is reported as negative unenhanced head CT scan.


* CT angiography of the head and neck is reported as negative.





* He was given aspirin 325 mg once in the ED.  I started the patient on ASA 81mg

   daily and Plavix 75mg daily.  The patient is to be on dual antiplatele for 21

   days then discontinue Plavix and permanetly continue ASA 81mg dialy.  

  Continue Lipitor 40mg qhs for secondary stroke prophylaxis.


* I ordered MRI of the brain w/ and w/o, 2D echo with bubble, TSH, lipid panel, 

  urine drug screen.


* Place patient on Q4 hours neuro checks and contious cardiac monitoring.


* Consulted speech therapy.  Physical therapy and occupational therapy are not 

  consulted since patient does not have any focal weakness.


* Psychiatry team is consulted.


* Will defer the rest of medical management to the primary team.


* The patient was notified that he needs to follow-up with a neurologist as 

  outpatient within 1-2 weeks as outpatient.





UPDATE:


MR the brain with and without is reported as normal.


2-D echo was reported as moderate concentric left ventricular hypertrophy.  

Ejection fraction of 60-65%.  Negative saline bubble study.  No PFO noted.


Urine toxicology screen is negative





We'll observe the patient overnight and and by tomorrow if he does not have any 

fruther symptoms then he is clear for discharge from a neurological standpoint.


The patient was notified that he needs to follow-up with a neurologist within 1-

2 weeks as an outpatient.





The plan is discussed with the patient and his parents who are at bedside.


Thank you for the consultation.





Higinio Munson MD


Neuro-Hospitalist





Time with Patient: Greater than 30

## 2021-06-22 NOTE — HP
HISTORY AND PHYSICAL



DATE OF SERVICE:

06/22/2021



CHIEF COMPLAINT:

Difficulty in speaking.



HISTORY OF PRESENT ILLNESS:

This 31-year-old gentleman with past medical history of no significant medical issues

was at work yesterday and the patient had some difficulty in speaking. The patient's

symptoms began about 5:00 PM. The patient is able to formulate what is supposed to be

spoken, but otherwise not coming out of his mouth. After several significant

difficulties, some words would come out according to him. Similar symptoms recurred

about 1:30 and the patient was taken to [facility] for further evaluation and

treatment.  There is no history of fever, rigors, or chills at this time.  The initial

evaluation showed glucose 108, otherwise Covid-19 was also negative. MRI has been

ordered at this time.  A CT scan of the brain and CT angio were done, showed no

evidence of any acute abnormality.  Psychiatric consultation was also sought as well as

Neurology consultation.  There is no history of fever, rigors, chills at this time.

The possibility of stroke versus seizures is being considered.



PAST MEDICAL HISTORY:

No significant cardiorespiratory illness.



MEDICATIONS:

Home medications are Claritin, vitamin D3, Flonase, Lexapro.



ALLERGIES:

None.



FAMILY HISTORY:

History of back problems in the family.  No history of strokes or heart attack.



SOCIAL HISTORY:

Previous history of smoking. No history of current smoking or alcohol intake.



REVIEW OF SYSTEMS:

ENT: No diminished vision.

CARDIOVASCULAR: No angina.

RESPIRATORY: No cough.

GI: No nausea or vomiting.

: No dysuria or hematuria.

NERVOUS SYSTEM: As mentioned earlier.

MUSCULOSKELETAL: As mentioned earlier.

HEMATOLOGY: No history of anemia.

ENDOCRINE: No history of diabetes or hypothyroidism.

CONSTITUTIONAL: As mentioned earlier.

DERMATOLOGY: Negative.

PSYCHIATRY8:  As mentioned.



PHYSICAL EXAMINATION:

Alert, oriented x3. Pulse 69, blood pressure 131/72, respirations 17, temperature 98.2,

pulse ox 97% on room air.

HEENT: Conjunctivae normal.

NECK: Supple.

CARDIOVASCULAR: S1 and S2 muffled.

LUNGS: Breath sounds heart at the bases. No rhonchi no crackles.

ABDOMEN:  Soft, nontender.

NERVOUS SYSTEM: Higher functions as mentioned earlier. Moves all 4 limbs.  No focal

motor deficit.

LYMPHATICS: No lymph nodes palpable.

SKIN: No rashes or ulcers.

JOINTS: No active deforming arthropathy.



LAB STUDIES:

WBC 7.2, hemoglobin 15.4, glucose 108.



ASSESSMENT:

1. Difficulty in speaking, possible acute transient ischemic attack.

2. Rule out seizure disorder.

3. History of ear tubes.

4. Remote history of nicotine dependence.

5. Obesity with body mass of 37.7.

6. FULL CODE.



RECOMMENDATIONS:

In this 31-year-old gentleman who presented with multiple medical issues, at this time

I recommend to continue current management and symptomatic treatment.  Neurology

evaluation.  Two-D echo was read by Cardiology shows ejection fraction 60 to 65%. CT

angio was negative.  MRI is pending at this time.  Follow closely with Neurology.

Remote telemetry. Guarded prognosis because of multiple complex problems. Further

recommendations to follow.





MMODL / IJN: [id number] / Job#: [id number]

## 2021-06-23 VITALS
TEMPERATURE: 97.6 F | RESPIRATION RATE: 17 BRPM | SYSTOLIC BLOOD PRESSURE: 114 MMHG | HEART RATE: 59 BPM | DIASTOLIC BLOOD PRESSURE: 61 MMHG

## 2021-06-23 RX ADMIN — CEFAZOLIN SCH: 330 INJECTION, POWDER, FOR SOLUTION INTRAMUSCULAR; INTRAVENOUS at 05:44

## 2021-06-23 RX ADMIN — CLOPIDOGREL BISULFATE SCH MG: 75 TABLET ORAL at 08:22

## 2021-06-23 RX ADMIN — ASPIRIN 81 MG CHEWABLE TABLET SCH MG: 81 TABLET CHEWABLE at 08:22

## 2021-06-23 RX ADMIN — ESCITALOPRAM OXALATE SCH MG: 10 TABLET, FILM COATED ORAL at 08:22

## 2021-06-23 NOTE — P.PN
Subjective


Progress Note Date: 06/23/21


The patient was seen at bedside and the per the patient as well as C has not had

any further similar episode difficulty getting his words out.  He continues to 

feel neurology currently back to baseline now.








Objective





- Vital Signs


Vital signs: 


                                   Vital Signs











Temp  97.6 F   06/23/21 07:00


 


Pulse  59 L  06/23/21 08:00


 


Resp  17   06/23/21 08:00


 


BP  114/61   06/23/21 07:00


 


Pulse Ox  98   06/23/21 07:00








                                 Intake & Output











 06/23/21 06/23/21 06/24/21





 06:59 18:59 06:59


 


Intake Total  540 


 


Balance  540 


 


Intake:   


 


  Oral  540 


 


Other:   


 


  Voiding Method Toilet Toilet 


 


  # Voids 2 2 














- Exam





GENERAL: The patient is lying in bed and is not in acute distress.





NEUROLOGICAL:


Higher mental function: The patient is awake, alert, oriented to self, place and

time.  Patient is following commands.  No aphasia and no neglect.


Cranial nerves: The pupils are round, equal and reactive to light and 

accommodation.  Visual fields are full to confrontation throughout.  Extraocular

movement is intact no nystagmus is noted.  Facial sensation is normal to touch 

throughout.  The facial strength is normal throughout.  Hearing is normal 

bilaterally to hand rub.  Tongue is midline and moved side-to-side without any 

difficulty.  No dysarthria is noted.  Shoulder shrug is normal bilaterally.


Motor: Gait is normal with normal arm swings.  The strength is 5 over 5 

throughout.  Normal tone and bulk.  


Cerebellum: Normal finger to nose heel to shin bilaterally.


Sensation: Sensation is normal to touch throughout.


Reflexes (right/left): 3+ over bilateral patellar.  Otherwise 2+ throughout.


Plantars are downgoing bilaterally.








- Labs


CBC & Chem 7: 


                                 06/21/21 19:51





                                 06/21/21 19:51





Assessment and Plan


Assessment: 





Transient episode of expressive aphasia with stuttering.  Likely Transient 

Ischemic attack.


Anxiety disorder





Plan: 





* CT of the head is reported as negative unenhanced head CT scan.


* CT angiography of the head and neck is reported as negative.


* MR the brain with and without is reported as normal.


* 2-D echo was reported as moderate concentric left ventricular hypertrophy.  

  Ejection fraction of 60-65%.  Negative saline bubble study.  No PFO noted.


* Urine toxicology screen is negative.


* TSH is 2.70 which is within normal limits.


* Hemoglobin A1c is 4.9 which is within normal limits





* Continue ASA 81mg daily and Plavix 75mg daily.  The patient is to be on dual 

  antiplatele for 21 days then discontinue Plavix and permanetly continue ASA 

  81mg dialy.  Continue Lipitor 40mg qhs for secondary stroke prophylaxis.


* Place patient on Q4 hours neuro checks and contious cardiac monitoring.


* Consulted speech therapy.  Physical therapy and occupational therapy are not 

  consulted since patient does not have any focal weakness.


* Psychiatry team is consulted.


* Will defer the rest of medical management to the primary team.


* The patient was notified that he needs to follow-up with a neurologist as 

  outpatient within 1-2 weeks as outpatient.





The plan is discussed with the patient and his nurse. 





Higinio Munson MD


Neuro-Hospitalist





Time with Patient: Less than 30

## 2021-06-23 NOTE — P.DS
Providers


Date of admission: 


06/21/21 23:34





Attending physician: 


Kari Denson





Consults: 





                                        





06/21/21 23:01


Consult Physician Routine 


   Consulting Provider: Higinio Munson


   Consult Reason/Comments: Speech difficulties


   Do you want consulting provider notified?: Yes





06/22/21 08:28


Consult Physician Routine 


   Consulting Provider: Ji Caceres


   Consult Reason/Comments: generalized anxiety and placed on new med leading to

speech


                                                difficulty


   Do you want consulting provider notified?: Already Contacted











Primary care physician: 


ZONIA Rogers [facility] Course: 


Patient presented with the transit episode of expressive aphasia and stuttering 

without any other weakness or any other any other focal deficits patient 

underwent extensive evaluation with CT of the head, CT angios the head and neck,

MRI, echocardiogram all of which are essentially within normal limits.  Patient 

was believed to have transient ischemic attack and patient is being discharged 

on aspirin 81 mg daily along with Plavix 75 mg for 3 weeks along with a statin.











PHYSICAL EXAMINATION: 





GENERAL: The patient is alert and oriented x3, not in any acute distress. Well 

developed, well nourished. 


HEENT: Pupils are round and equally reacting to light. EOMI. No scleral icterus.

No conjunctival pallor. Normocephalic, atraumatic. No pharyngeal erythema. No 

thyromegaly. 


CARDIOVASCULAR: S1 and S2 present. No murmurs, rubs, or gallops. 


PULMONARY: Chest is clear to auscultation, no wheezing or crackles. 


ABDOMEN: Soft, nontender, nondistended, normoactive bowel sounds. No palpable 

organomegaly. 


MUSCULOSKELETAL: No joint swelling or deformity.


EXTREMITIES: No cyanosis, clubbing, or pedal edema. 


NEUROLOGICAL: Gross neurological examination did not reveal any focal deficits. 


SKIN: No rashes. 





-Possible TIA, expressive aphasia transient.


-Obesity


-Anxiety disorder





Patient Condition at Discharge: Stable





Plan - Discharge Summary


Discharge Rx Participant: No


New Discharge Prescriptions: 


New


   Clopidogrel [Plavix] 75 mg PO DAILY #20 tab


   traZODone HCL [Desyrel] 25 mg PO HS #15 tab


   Aspirin 81 mg PO DAILY #30 chew


   Atorvastatin [Lipitor] 40 mg PO HS #30 tab





Continue


   Fluticasone Nasal Spray [Flonase Nasal Spray] 2 spr EA NOSTRIL DAILY PRN


     PRN Reason: Allergy Symptoms


   Escitalopram [Lexapro] 10 mg PO DAILY


   Loratadine [Claritin] 10 mg PO DAILY


   Cholecalciferol (Vitamin D3) [Vitamin D3 (5000 Iu)] 125 mcg PO DAILY


Discharge Medication List





Cholecalciferol (Vitamin D3) [Vitamin D3 (5000 Iu)] 125 mcg PO DAILY 06/21/21 

[History]


Escitalopram [Lexapro] 10 mg PO DAILY 06/21/21 [History]


Fluticasone Nasal Spray [Flonase Nasal Spray] 2 spr EA NOSTRIL DAILY PRN 

06/21/21 [History]


Loratadine [Claritin] 10 mg PO DAILY 06/21/21 [History]


Aspirin 81 mg PO DAILY #30 chew 06/23/21 [Rx]


Atorvastatin [Lipitor] 40 mg PO HS #30 tab 06/23/21 [Rx]


Clopidogrel [Plavix] 75 mg PO DAILY #20 tab 06/23/21 [Rx]


traZODone HCL [Desyrel] 25 mg PO HS #15 tab 06/23/21 [Rx]








Follow up Appointment(s)/Referral(s): 


ZONIA Benites III, MD [Primary Care Provider] - 3 Days


Higinio Munson MD [STAFF PHYSICIAN] - 1 Week


Discharge Disposition: HOME SELF-CARE